# Patient Record
Sex: MALE | URBAN - METROPOLITAN AREA
[De-identification: names, ages, dates, MRNs, and addresses within clinical notes are randomized per-mention and may not be internally consistent; named-entity substitution may affect disease eponyms.]

---

## 2017-04-18 ENCOUNTER — TELEMEDICINE (OUTPATIENT)
Dept: TELEMEDICINE | Facility: OTHER | Age: 67
End: 2017-04-18
Payer: MEDICARE

## 2017-04-18 DIAGNOSIS — I63.312 THROMBOTIC STROKE INVOLVING LEFT MIDDLE CEREBRAL ARTERY: ICD-10-CM

## 2017-04-18 DIAGNOSIS — G81.11 RIGHT SPASTIC HEMIPARESIS: ICD-10-CM

## 2017-04-18 DIAGNOSIS — I10 ESSENTIAL HYPERTENSION: ICD-10-CM

## 2017-04-18 DIAGNOSIS — E78.5 DYSLIPIDEMIA: ICD-10-CM

## 2017-04-18 PROCEDURE — G0508 CRIT CARE TELEHEA CONSULT 60: HCPCS | Mod: GT,,, | Performed by: PSYCHIATRY & NEUROLOGY

## 2017-04-18 NOTE — TELEMEDICINE CONSULT
[facility]/Vascular Neurology  Tele-Consult    Consultation started: 4/18/2017 at 12:47 AM   Consulting Provider:  Leandro  The patient location is [facility] Emergency Department  Spoke hospital nurse at bedside with patient assisting consultant.     Subjective:   Subjective   History of Present Illness:  Kendrick Molina is a 66 y.o. male who presents with right sided weakness and dysarthria.    Reading at 11pm this evening, got up to go to the bathroom and couldn't feel his right side and called an ambulance. He was walking about 5 minutes earlier than that without any problems. Feels like his dysarthria is improved but not back to normal. These symptoms have never happened before. There are no identified triggers or modifying factors. There have been no recurrent events. There are no other associated symptoms.    Wake up Stroke?: no  Last known normal:  2300  Recent bleeding noted: no  Does the patient take any Blood Thinners? no     H&P was obtained from patient.  Past Medical History: tobacco, HTN, HLD, back surgery, CABG/CAD    Medications: No current outpatient prescriptions on file.    Allergies: Review of patient's allergies indicates:  Allergies not on file    Objective:     Vitals: There were no vitals filed for this visit.     Objective   Physical Exam:  General: well developed, well nourished   HENT: Head:normocephalic and atraumatic   HENT: Ears: right ear normal and left ear normal  Nose: normal nares and no discharge  Eyes:conjunctivae/corneas clear. PERRL.  Neck: normal, no obvious masses and trachea to midline  Lungs: normal respiratory effort  Cardiovascular: Heart: regular rate and rhythm   Cardiovascular: Extremities: no cyanosis, no edema and no clubbing  Abdomen: appears normal and not distended  Skin:  skin color and texture normal, no rash and no bruises  Musculoskeletal: normal range of motion in all extremities       Imaging Notes: No hemmorhage. No mass effect. No early infarct signs.  Impression performed at: 0050    NIH Stroke Scale:  Interval: baseline (upon arrival/admit)  Level of Consciousness: 0 - alert  LOC Questions: 0 - answers both correctly  LOC Commands: 0 - performs both correctly  Best Gaze: 0 - normal  Visual: 0 - no visual loss  Facial Palsy: 0 - normal  Motor Left Arm: 0 - no drift  Motor Right Arm: 1 - drift  Motor Left Le - no drift  Motor Right Leg: 3 - no effort against gravity  Limb Ataxia: 0 - absent  Sensory: 1 - mild to moderate loss  Best Language: 0 - no aphasia  Dysarthria: 1 - mild to moderate dysarthria  Extinction and Inattention: 0 - no neglect  NIH Stroke Scale Total: 6        Assessment - Diagnosis - Goals:     Plan     Diagnosis/Impression: acute thrombotic stroke of left MCA resulting in hemisensorimotor findings w/ absence of cortical signs; risks and benefits of tPA discussed with patient who understands and agrees with administration of the medication    Altaplase Recommendation:   Altaplase Recommendation   Altaplase Total Dose:          Bolus Dose       Intravenously over 1 minute (10% of Total Dose)   Infusion Dose       Continuous Infusion over 60 Minutes     Additional Recommendations:   1. Neurological assessment and vital signs (except temperature) every 15 minutes during Altaplase infusion.  2. Frequency of BP assessments may need to be increased if systolic BP stays >= 180 mm Hg or diastolic BP stays >= 105 mm Hg. Administer antihypertensive meds as ordered  3. Continue to monitor and control blood pressure and monitor for neurological deterioration every 15 minutes for the first hour after the infusion is stopped. Then every 30 minutes for the next 6 hours. Perform hourly monitoring from the 8th post-infusion hour until 24 hours post-infusion.  4. Temperature every 4 hours or as required.  5. Follow hospital protocol for further orders re: post tPA infusion patient management.  6. No antithrombotics or anticoagulants (including but not limited  to: heparin, warfarin, aspirin, clopidigrel, or dipyridamole) for 24 hours, then start antithrombotics as ordered by treating physician    Adapted from the American Heart Association/American Stroke Association (AHA/ASA) and American Association of Neuroscience Nurses (AANN) Guidelines.  Recommendation given at: 0055    Recommendation: NPO until after pass dysphagia screen. Diagnostic studies: CTA Head to assess vasculature , CTA Neck/Arch to assess vasculature, HgbA1C to assess blood glucose levels, Lipid Profile to assess cholesterol levels, MRI head without contrast to assess brain parenchyma, Trans-thoracic cardiac echo to assess cardiac function/status  Consults: Rehab Consult; Occupational Therapy, Rehab Consult; Physical Therapy and Rehab Consult; Speech Therapy  Antithrombotics: Hold all Antithrombotics x 24 hrs after IV t-PA given  Statins: Atorvastatin- 40 mg oral daily    Disposition Recommendation:  patient requesting to stay at [facility] and charge nurse states that they do have capability to keep post-tpa patients       Possible Interventional Revascularization Candidate? no LVO signs, majority of deficit is d/t hemiparesis w/ sensory deficit in one limb    Recommended the emergency room physician to have a brief discussion with the patient and/or family if available regarding the risks and benefits of treatment, and to briefly document the occurrence of that discussion in his clinical encounter note.     The attending portion of this evaluation, treatment, and documentation was performed per Sachin Andres via audiovisual.      Billing code:  (time dependent stroke, complex case, unstable patient, hemorrhages, any intervention, some mimics)    · This patient has a very critical neurological condition/illness, with very high morbidity and mortality.  · There is a very high probability for acute neurological change leading to clinical and possibly life-threatening deterioration requiring highest level  of physician preparedness for urgent intervention.  · There is possibility that this condition will require treatment with high risk medications as quickly as possible.  · There is also a possibility that the patient may benefit from further, more advance complex therapies (e.g. endovascular therapy) that will require prompt diagnosis and care.  · Care was coordinated with other physicians involved in the patient's care.  · Radiologic studies and laboratory data were reviewed and interpreted, and plan of care was re-assessed based on the results.  · Diagnosis, treatment options and prognosis may have been discussed with the patient and/or family members or caregiver.  Further advanced medical management and further evaluation is warranted for his care.      Consultation ended: 4/18/2017 at 0110    Kendrick Andres MD  Vascular Neurology

## 2017-05-26 ENCOUNTER — TELEMEDICINE (OUTPATIENT)
Dept: ADMINISTRATIVE | Facility: HOSPITAL | Age: 67
End: 2017-05-26
Payer: MEDICARE

## 2017-05-26 DIAGNOSIS — I63.312 THROMBOTIC STROKE INVOLVING LEFT MIDDLE CEREBRAL ARTERY: ICD-10-CM

## 2017-05-26 DIAGNOSIS — G81.91 HEMIPARESIS, RIGHT: ICD-10-CM

## 2017-05-26 DIAGNOSIS — R47.1 DYSARTHRIA: ICD-10-CM

## 2017-05-26 PROCEDURE — G0426 INPT/ED TELECONSULT50: HCPCS | Mod: GT,,, | Performed by: PSYCHIATRY & NEUROLOGY

## 2017-05-26 NOTE — CONSULTS
[facility]Vascular Neurology  Tele-Consult    Consultation started: 5/26/2017 at 11:33 AM   Consulting Provider:  Elena  The patient location is [facility] Emergency Department  Spoke hospital nurse at bedside with patient assisting consultant.     Subjective:   Subjective   History of Present Illness:  Kendrick Molina is a 66 y.o. male who presents with worsening R sided weakness from baseline.  Woke up at 0500 normal at baseline w/ some mild R sided weakness from prior infarct 4/18, but then at 0700 started with worsening weakness and drooling and right facial weakness. These symptoms have occurred before, during last stroke presentation on last telemedicine consult 4/18 where he was given IVtPA by me. There are no identified triggers or modifying factors. There have been no recurrent events. There are no other associated symptoms.    Wake up Stroke?: no  Last known normal:  0700  Recent bleeding noted: no  Does the patient take any Blood Thinners? yes     H&P was obtained from patient.  Past Medical History: tobacco, HTN, HLD, back surgery, CABG/CAD     Medications: No current outpatient prescriptions on file.     Allergies: Review of patient's allergies indicates:  Allergies not on file        From last telemedicine consult 4/18  Reading at 11pm this evening, got up to go to the bathroom and couldn't feel his right side and called an ambulance. He was walking about 5 minutes earlier than that without any problems. Feels like his dysarthria is improved but not back to normal. These symptoms have never happened before. There are no identified triggers or modifying factors. There have been no recurrent events. There are no other associated symptoms.     Wake up Stroke?: no  Last known normal:  2300  Recent bleeding noted: no  Does the patient take any Blood Thinners? no         Objective:     Vitals: There were no vitals filed for this visit.     Objective   Physical Exam:  General: well developed, well nourished    HENT: Head:normocephalic and atraumatic   HENT: Ears: right ear normal and left ear normal  Nose: normal nares and no discharge  Eyes:conjunctivae/corneas clear. PERRL.  Neck: normal, no obvious masses and trachea to midline  Lungs: normal respiratory effort  Cardiovascular: Heart: regular rate and rhythm   Cardiovascular: Extremities: no cyanosis, no edema and no clubbing  Abdomen: appears normal and not distended  Skin:  skin color and texture normal, no rash and no bruises  Musculoskeletal: normal range of motion in all extremities       Imaging Notes: No hemmorhage. No mass effect. No early infarct signs. Impression performed at: 1140 (chronic microvascular changes bilaterally)    NIH Stroke Scale:  Interval: baseline (upon arrival/admit)  Level of Consciousness: 0 - alert  LOC Questions: 0 - answers both correctly  LOC Commands: 0 - performs both correctly  Best Gaze: 0 - normal  Visual: 0 - no visual loss  Facial Palsy: 1 - minor  Motor Left Arm: 0 - no drift  Motor Right Arm: 1 - drift  Motor Left Le - no drift  Motor Right Le - drift  Limb Ataxia: 0 - absent  Sensory: 0 - normal  Best Language: 0 - no aphasia  Dysarthria: 1 - mild to moderate dysarthria  Extinction and Inattention: 0 - no neglect  NIH Stroke Scale Total: 4        Assessment - Diagnosis - Goals:     Plan     Diagnosis/Impression: reexression /exacerbation of previous stroke symptoms vs. acute on chronic infarction, likelsy small subcortical infarct extension; would benefit from MRI evaluation if no laboratory findings (i.e. UTI, hyponatremia, hyper/hypoglycemia) are present to explain his worsening    Altaplase Recommendation: Altaplase not recommended due to Outside of treament window and Recent intracranial or spinal surgery, head trauma, or stroke (< 3 months)    Recommendation: NPO until after pass dysphagia screen. Diagnostic studies: Other: MRI if no laboratory explanation of neuroworsening present    Disposition  Recommendation:  admit to inpatient       Possible Interventional Revascularization Candidate? no    Recommended the emergency room physician to have a brief discussion with the patient and/or family if available regarding the risks and benefits of treatment, and to briefly document the occurrence of that discussion in his clinical encounter note.     The attending portion of this evaluation, treatment, and documentation was performed per Sachin Andres via audiovisual.      Billing code:  (moderate to severe stroke, large areas of edema, some mimics)    · This patient has a critical neurological condition/illness, with high morbidity and mortality.  · There is a high probability for acute neurological change leading to clinical and possibly life-threatening deterioration requiring highest level of physician preparedness for urgent intervention.  · Care was coordinated with other physicians involved in the patient's care.  · Radiologic studies and laboratory data were reviewed and interpreted, and plan of care was re-assessed based on the results.  · Diagnosis, treatment options and prognosis may have been discussed with the patient and/or family members or caregiver.  Further advanced medical management and further evaluation is warranted for his care.      Consultation ended: 5/26/2017 at 1202    Kendrick Andres MD  Vascular Neurology

## 2020-03-30 ENCOUNTER — HISTORICAL (OUTPATIENT)
Dept: CARDIOLOGY | Facility: HOSPITAL | Age: 70
End: 2020-03-30

## 2020-03-30 LAB
ABS NEUT (OLG): 6.73 X10(3)/MCL (ref 2.1–9.2)
BASOPHILS # BLD AUTO: 0 X10(3)/MCL (ref 0–0.2)
BASOPHILS NFR BLD AUTO: 0 %
BUN SERPL-MCNC: 34 MG/DL (ref 8.4–25.7)
CALCIUM SERPL-MCNC: 9.8 MG/DL (ref 8.8–10)
CHLORIDE SERPL-SCNC: 111 MMOL/L (ref 98–107)
CO2 SERPL-SCNC: 23 MMOL/L (ref 23–31)
CREAT SERPL-MCNC: 1.42 MG/DL (ref 0.73–1.18)
CREAT/UREA NIT SERPL: 24
EOSINOPHIL # BLD AUTO: 0.3 X10(3)/MCL (ref 0–0.9)
EOSINOPHIL NFR BLD AUTO: 4 %
ERYTHROCYTE [DISTWIDTH] IN BLOOD BY AUTOMATED COUNT: 14.1 % (ref 11.5–17)
GLUCOSE SERPL-MCNC: 91 MG/DL (ref 82–115)
HCT VFR BLD AUTO: 44.7 % (ref 42–52)
HGB BLD-MCNC: 13.9 GM/DL (ref 14–18)
INR PPP: 1 (ref 0–1.3)
LYMPHOCYTES # BLD AUTO: 2 X10(3)/MCL (ref 0.6–4.6)
LYMPHOCYTES NFR BLD AUTO: 20 %
MCH RBC QN AUTO: 29.9 PG (ref 27–31)
MCHC RBC AUTO-ENTMCNC: 31.1 GM/DL (ref 33–36)
MCV RBC AUTO: 96.1 FL (ref 80–94)
MONOCYTES # BLD AUTO: 0.6 X10(3)/MCL (ref 0.1–1.3)
MONOCYTES NFR BLD AUTO: 7 %
NEUTROPHILS # BLD AUTO: 6.73 X10(3)/MCL (ref 2.1–9.2)
NEUTROPHILS NFR BLD AUTO: 69 %
PLATELET # BLD AUTO: 251 X10(3)/MCL (ref 130–400)
PMV BLD AUTO: 11.8 FL (ref 9.4–12.4)
POTASSIUM SERPL-SCNC: 5.2 MMOL/L (ref 3.5–5.1)
PROTHROMBIN TIME: 12.6 SECOND(S) (ref 11.1–13.7)
RBC # BLD AUTO: 4.65 X10(6)/MCL (ref 4.7–6.1)
SODIUM SERPL-SCNC: 141 MMOL/L (ref 136–145)
WBC # SPEC AUTO: 9.8 X10(3)/MCL (ref 4.5–11.5)